# Patient Record
Sex: MALE | Race: WHITE
[De-identification: names, ages, dates, MRNs, and addresses within clinical notes are randomized per-mention and may not be internally consistent; named-entity substitution may affect disease eponyms.]

---

## 2019-02-03 ENCOUNTER — HOSPITAL ENCOUNTER (EMERGENCY)
Dept: HOSPITAL 76 - ED | Age: 19
Discharge: HOME | End: 2019-02-03
Payer: COMMERCIAL

## 2019-02-03 VITALS — SYSTOLIC BLOOD PRESSURE: 129 MMHG | DIASTOLIC BLOOD PRESSURE: 79 MMHG

## 2019-02-03 DIAGNOSIS — B34.9: Primary | ICD-10-CM

## 2019-02-03 DIAGNOSIS — Z88.0: ICD-10-CM

## 2019-02-03 PROCEDURE — 93005 ELECTROCARDIOGRAM TRACING: CPT

## 2019-02-03 PROCEDURE — 99283 EMERGENCY DEPT VISIT LOW MDM: CPT

## 2019-02-03 PROCEDURE — 87276 INFLUENZA A AG IF: CPT

## 2019-02-03 PROCEDURE — 87275 INFLUENZA B AG IF: CPT

## 2019-02-03 PROCEDURE — 71046 X-RAY EXAM CHEST 2 VIEWS: CPT

## 2019-02-03 NOTE — XRAY REPORT
Reason:  cp

Procedure Date:  02/03/2019   

Accession Number:  [id number] / [id number]                    

Procedure:  XR  - Chest 2 View X-Ray CPT Code:  10517

 

FULL RESULT:

 

 

EXAM:

CHEST RADIOGRAPHY

 

EXAM DATE: 2/3/2019 02:30 PM.

 

CLINICAL HISTORY: Cp.

 

COMPARISON: None.

 

TECHNIQUE: 2 views.

 

FINDINGS:

Lungs/Pleura: No focal opacities evident. No pleural effusion. No 

pneumothorax. Normal volumes.

 

Mediastinum: Heart and mediastinal contours are unremarkable.

 

Other: None.

IMPRESSION: Normal 2-view chest radiography.

 

[facility]

## 2019-02-03 NOTE — ED PHYSICIAN DOCUMENTATION
History of Present Illness





- Stated complaint


Stated Complaint: SOA/TIGHNESS





- Chief complaint


Chief Complaint: Resp





- Additonal information


Additional information: 


18-year-old male presents the emergency department for evaluation of day 2 of 

generalized weakness, body aches, chills, feeling feverish, feeling short of 

breath and generally unwell.  The symptoms started rather suddenly yesterday 

while out after eating.  The patient's symptoms have continued today.Symptoms 

are described as mild.  No other associated symptoms.  No relieving factors.








Review of Systems


Constitutional: reports: Fever, Chills, Myalgias, Fatigue


Eyes: denies: Discharge


Ears: denies: Ear pain


Nose: denies: Rhinorrhea / runny nose


Throat: reports: Sore throat


Respiratory: reports: Dyspnea


GI: denies: Abdominal Pain


: denies: Dysuria


Skin: denies: Rash


Musculoskeletal: denies: Neck pain


Neurologic: denies: Generalized weakness





PD PAST MEDICAL HISTORY





- Present Medications


Home Medications: 


                                Ambulatory Orders











 Medication  Instructions  Recorded  Confirmed


 


No Known Home Medications  02/03/19 02/03/19














- Allergies


Allergies/Adverse Reactions: 


                                    Allergies











Allergy/AdvReac Type Severity Reaction Status Date / Time


 


Penicillins Allergy  Rash Verified 02/03/19 13:09














- Social History


Does the pt smoke?: No


Smoking Status: Never smoker





PD ED PE NORMAL





- General


General: Alert and oriented X 3, No acute distress





- HEENT


HEENT: Atraumatic, PERRL, EOMI, Ears normal





- Neck


Neck: Supple, no meningeal sign





- Cardiac


Cardiac: RRR, Strong equal pulses





- Respiratory


Respiratory: No respiratory distress





- Abdomen


Abdomen: Soft, Non tender





- Derm


Derm: Normal color





- Extremities


Extremities: No deformity, No edema





- Neuro


Neuro: Alert and oriented X 3, CNs 2-12 intact, Normal speech





- Psych


Psych: Normal mood





Results





- Vitals


Vitals: 


                               Vital Signs - 24 hr











  02/03/19





  13:07


 


Temperature 36.4 C L


 


Heart Rate 91


 


Respiratory 20





Rate 


 


Blood Pressure 146/93 H


 


O2 Saturation 100








                                     Oxygen











O2 Source                      Room air

















- EKG (time done)


  ** 14:07


Rate: Rate (enter#)


Rhythm: NSR


Intervals: Normal DC


QRS: Normal


Ischemia: Normal ST segments





- Labs


Labs: 


                                Laboratory Tests











  02/03/19





  14:04


 


Influenza A (Rapid)  Negative


 


Influenza B (Rapid)  Negative














- Rads (name of study)


  ** CXR


Radiology: Final report received, See rad report (IMPRESSION: Normal 2-view 

chest radiography. )





PD MEDICAL DECISION MAKING





- ED course


ED course: 


The patient's symptoms seem to be consistent with a viral etiology.  The patient

on reevaluation is resting comfortably and appears to be in no acute distress 

and appears appropriate for discharge and ongoing outpatient management.  The 

patient will return to the emergency department for any worsening or any 

concerns.








Departure





- Departure


Disposition: 01 Home, Self Care


Clinical Impression: 


 Viral syndrome





Condition: Good


Instructions:  ED Viral Syndrome


Follow-Up: 


[facility] [place] [Provider Group] - Within 1 week


Comments: 


Please return to the emergency department for worsening symptoms or any concerns

## 2019-10-26 ENCOUNTER — HOSPITAL ENCOUNTER (EMERGENCY)
Dept: HOSPITAL 76 - ED | Age: 19
Discharge: HOME | End: 2019-10-26
Payer: COMMERCIAL

## 2019-10-26 VITALS — SYSTOLIC BLOOD PRESSURE: 139 MMHG | DIASTOLIC BLOOD PRESSURE: 58 MMHG

## 2019-10-26 DIAGNOSIS — Z98.818: ICD-10-CM

## 2019-10-26 DIAGNOSIS — Z88.0: ICD-10-CM

## 2019-10-26 DIAGNOSIS — K08.89: Primary | ICD-10-CM

## 2019-10-26 DIAGNOSIS — H93.11: ICD-10-CM

## 2019-10-26 PROCEDURE — 99282 EMERGENCY DEPT VISIT SF MDM: CPT

## 2019-10-26 PROCEDURE — 99284 EMERGENCY DEPT VISIT MOD MDM: CPT

## 2019-10-26 NOTE — ED PHYSICIAN DOCUMENTATION
PD HPI HEENT





- Stated complaint


Stated Complaint: DENTAL PX





- Chief complaint


Chief Complaint: Heent





- History obtained from


History obtained from: Patient





- History of Present Illness


Timing - duration: Days (2-3)


Timing - details: Gradual onset


Severity Comments: Minimal pain that is relieved with ibuprofen


Location: Other (Right jaw at the site of wisdom tooth extraction)


Associated symptoms: No: Fever


Recently seen: Not recently seen





- Additional information


Additional information: 





Is a 19-year-old who had his wisdom teeth extracted 8 days ago.  He had a 

follow-up5 days ago and things seem to be doing well.  He is now developed a 

weird taste coming from the socket on the bottom right.  The other sites have 

healed without any problem.  He has not had a fever.  He feels like his right 

ears are ringing all the time.  He has minimal pain that is relieved with 

ibuprofen.  He is allergic to penicillin it causes a rash.  No fever.





Review of Systems


Constitutional: denies: Fever


Ears: reports: Tinnitus/ringing


Throat: reports: Dental pain / toothache





PD PAST MEDICAL HISTORY





- Present Medications


Home Medications: 


                                Ambulatory Orders











 Medication  Instructions  Recorded  Confirmed


 


Clindamycin HCl [Clindamycin 300MG 300 mg PO QID #40 capsule 10/26/19 





CAP]   














- Allergies


Allergies/Adverse Reactions: 


                                    Allergies











Allergy/AdvReac Type Severity Reaction Status Date / Time


 


Penicillins Allergy  Rash Verified 10/26/19 17:57














- Social History


Does the pt smoke?: No


Smoking Status: Never smoker





PD ED PE NORMAL





- Vitals


Vital signs reviewed: Yes





- General


General: Alert and oriented X 3, No acute distress, Well developed/nourished





- HEENT


HEENT: Atraumatic, Moist mucous membranes, Other (The extraction sites on the 

left upper and lower are well-healed.  The right lower there is still a hole 

president.  There is no distinct odor.  There is no inflammatory changes noted. 

No jaw swelling and no bony tenderness.  There is no lymphadenopathy.)





- Neck


Neck: No adenopathy





- Derm


Derm: Normal color





- Neuro


Neuro: Alert and oriented X 3, Normal speech





- Psych


Psych: Normal mood, Normal affect





Results





- Vitals


Vitals: 


                               Vital Signs - 24 hr











  10/26/19





  17:57


 


Temperature 36.8 C


 


Heart Rate 85


 


Respiratory 16





Rate 


 


Blood Pressure 139/58 H


 


O2 Saturation 100








                                     Oxygen











O2 Source                      Room air

















PD MEDICAL DECISION MAKING





- ED course


Complexity details: d/w patient


ED course: 





2 of 3 wisdom tooth extraction sites have healed well.  Will place him on 

clindamycin given the penicillin allergy.  He is to continue swishing with the 

oral rinse he was provided by the dentist.  Recommended that he contact them for

follow-up appointment to make sure that any infection has cleared.





Departure





- Departure


Disposition: 01 Home, Self Care


Clinical Impression: 


 Pain, dental, Status post tooth extraction





Condition: Good


Instructions:  ED Tooth Pain


Follow-Up: 


[facility] [place] [Provider Group]


Prescriptions: 


Clindamycin HCl [Clindamycin 300MG CAP] 300 mg PO QID #40 capsule


Comments: 


Continue to do the oral rinses as prescribed by the dentist.  Take the 

clindamycin 1 tablet 4 times a day.  I would recommend that you take probiotics 

while you are taking the antibiotic and for 2 weeks afterwards.  Follow-up with 

the dentist for reevaluation and make sure that the infection has been cleared.


Discharge Date/Time: 10/26/19 18:40

## 2020-01-14 ENCOUNTER — HOSPITAL ENCOUNTER (EMERGENCY)
Dept: HOSPITAL 76 - ED | Age: 20
Discharge: HOME | End: 2020-01-14
Payer: COMMERCIAL

## 2020-01-14 VITALS — SYSTOLIC BLOOD PRESSURE: 121 MMHG | DIASTOLIC BLOOD PRESSURE: 70 MMHG

## 2020-01-14 DIAGNOSIS — R10.12: Primary | ICD-10-CM

## 2020-01-14 LAB
ALBUMIN DIAFP-MCNC: 4.4 G/DL (ref 3.2–5.5)
ALBUMIN/GLOB SERPL: 2 {RATIO} (ref 1–2.2)
ALP SERPL-CCNC: 51 IU/L (ref 42–121)
ALT SERPL W P-5'-P-CCNC: 16 IU/L (ref 10–60)
ANION GAP SERPL CALCULATED.4IONS-SCNC: 11 MMOL/L (ref 6–13)
AST SERPL W P-5'-P-CCNC: 21 IU/L (ref 10–42)
BASOPHILS NFR BLD AUTO: 0 10^3/UL (ref 0–0.1)
BASOPHILS NFR BLD AUTO: 0.4 %
BILIRUB BLD-MCNC: 2.4 MG/DL (ref 0.2–1)
BUN SERPL-MCNC: 15 MG/DL (ref 6–20)
CALCIUM UR-MCNC: 9.4 MG/DL (ref 8.5–10.3)
CHLORIDE SERPL-SCNC: 101 MMOL/L (ref 101–111)
CLARITY UR REFRACT.AUTO: CLEAR
CO2 SERPL-SCNC: 28 MMOL/L (ref 21–32)
CREAT SERPLBLD-SCNC: 1 MG/DL (ref 0.6–1.2)
EOSINOPHIL # BLD AUTO: 0 10^3/UL (ref 0–0.7)
EOSINOPHIL NFR BLD AUTO: 0.4 %
ERYTHROCYTE [DISTWIDTH] IN BLOOD BY AUTOMATED COUNT: 11.4 % (ref 12–15)
GFRSERPLBLD MDRD-ARVRAT: 94 ML/MIN/{1.73_M2} (ref 89–?)
GLOBULIN SER-MCNC: 2.2 G/DL (ref 2.1–4.2)
GLUCOSE SERPL-MCNC: 97 MG/DL (ref 70–100)
GLUCOSE UR QL STRIP.AUTO: NEGATIVE MG/DL
HGB UR QL STRIP: 14.6 G/DL (ref 14–18)
KETONES UR QL STRIP.AUTO: NEGATIVE MG/DL
LIPASE SERPL-CCNC: 24 U/L (ref 22–51)
LYMPHOCYTES # SPEC AUTO: 1.3 10^3/UL (ref 1.5–3.5)
LYMPHOCYTES NFR BLD AUTO: 25.8 %
MCH RBC QN AUTO: 30.4 PG (ref 27–31)
MCHC RBC AUTO-ENTMCNC: 33.8 G/DL (ref 32–36)
MCV RBC AUTO: 89.8 FL (ref 80–94)
MONOCYTES # BLD AUTO: 0.4 10^3/UL (ref 0–1)
MONOCYTES NFR BLD AUTO: 7.6 %
NEUTROPHILS # BLD AUTO: 3.2 10^3/UL (ref 1.5–6.6)
NEUTROPHILS # SNV AUTO: 4.9 X10^3/UL (ref 4.8–10.8)
NEUTROPHILS NFR BLD AUTO: 65.6 %
NITRITE UR QL STRIP.AUTO: NEGATIVE
PDW BLD AUTO: 9.7 FL (ref 7.4–11.4)
PH UR STRIP.AUTO: 6 PH (ref 5–7.5)
PLATELET # BLD: 246 10^3/UL (ref 130–450)
PROT SPEC-MCNC: 6.6 G/DL (ref 6.7–8.2)
PROT UR STRIP.AUTO-MCNC: NEGATIVE MG/DL
RBC # UR STRIP.AUTO: NEGATIVE /UL
RBC MAR: 4.81 10^6/UL (ref 4.7–6.1)
SODIUM SERPLBLD-SCNC: 140 MMOL/L (ref 135–145)
SP GR UR STRIP.AUTO: 1.02 (ref 1–1.03)
SQUAMOUS URNS QL MICRO: (no result)
T VAGINALIS RRNA GENITAL QL PROBE: NEGATIVE
UROBILINOGEN UR QL STRIP.AUTO: (no result) E.U./DL
UROBILINOGEN UR STRIP.AUTO-MCNC: NEGATIVE MG/DL

## 2020-01-14 PROCEDURE — 83690 ASSAY OF LIPASE: CPT

## 2020-01-14 PROCEDURE — 87491 CHLMYD TRACH DNA AMP PROBE: CPT

## 2020-01-14 PROCEDURE — 87086 URINE CULTURE/COLONY COUNT: CPT

## 2020-01-14 PROCEDURE — 99284 EMERGENCY DEPT VISIT MOD MDM: CPT

## 2020-01-14 PROCEDURE — 99283 EMERGENCY DEPT VISIT LOW MDM: CPT

## 2020-01-14 PROCEDURE — 81001 URINALYSIS AUTO W/SCOPE: CPT

## 2020-01-14 PROCEDURE — 36415 COLL VENOUS BLD VENIPUNCTURE: CPT

## 2020-01-14 PROCEDURE — 80053 COMPREHEN METABOLIC PANEL: CPT

## 2020-01-14 PROCEDURE — 87591 N.GONORRHOEAE DNA AMP PROB: CPT

## 2020-01-14 PROCEDURE — 85025 COMPLETE CBC W/AUTO DIFF WBC: CPT

## 2020-01-14 PROCEDURE — 81003 URINALYSIS AUTO W/O SCOPE: CPT

## 2020-01-14 PROCEDURE — 87661 TRICHOMONAS VAGINALIS AMPLIF: CPT

## 2020-01-14 NOTE — ED PHYSICIAN DOCUMENTATION
History of Present Illness





- Stated complaint


Stated Complaint: ABD PX





- Chief complaint


Chief Complaint: Abd Pain





- Additonal information


Additional information: 


This is a 19-year-old male who denies past medical history presents with abdomi

nal pain for 4 to 5 months which got little bit worse recently.  He states that 

his pain is been mostly left upper quadrant somewhat intermittent, radiates 

toward his epigastrium.  In the last several days he is also had a bit of 

discomfort in his right lower quadrant this is come and gone.  No nausea no 

vomiting no blood in his stool, he has not felt constipated.  He has not yet 

seen a provider for this issue.  He has not tried medications for it.  No 

testicular pain, no pain or burning when he urinates.








Review of Systems


Constitutional: denies: Fever


Cardiac: denies: Chest pain / pressure


GI: reports: Abdominal Pain


: denies: Dysuria


Neurologic: denies: Generalized weakness


Immunocompromised: denies: Immunocompromised





PD PAST MEDICAL HISTORY





- Past Medical History


Past Medical History: No


HEENT: Other





- Past Surgical History


Past Surgical History: No





- Present Medications


Home Medications: 


                                Ambulatory Orders











 Medication  Instructions  Recorded  Confirmed


 


Clindamycin HCl [Clindamycin 300MG 300 mg PO QID #40 capsule 10/26/19 





CAP]   


 


Famotidine [Acid Controller] 20 mg PO DAILY #14 tablet 01/14/20 














- Allergies


Allergies/Adverse Reactions: 


                                    Allergies











Allergy/AdvReac Type Severity Reaction Status Date / Time


 


Penicillins Allergy  Rash Verified 01/14/20 12:15














- Social History


Does the pt smoke?: No


Smoking Status: Never smoker


Does the pt drink ETOH?: Yes


ETOH Use: Beer





- Immunizations


Immunizations are current?: Yes





PD ED PE NORMAL





- Vitals


Vital signs reviewed: Yes





- General


General: Alert and oriented X 3, No acute distress





- HEENT


HEENT: PERRL





- Neck


Neck: Supple, no meningeal sign





- Cardiac


Cardiac: RRR, No murmur





- Respiratory


Respiratory: Clear bilaterally





- Abdomen


Abdomen: Soft, Non tender, Non distended, Other (No guarding.  No right lower 

quadrant or right upper quadrant tenderness with deep palpation)





- Derm


Derm: Warm and dry





- Extremities


Extremities: No deformity





- Neuro


Neuro: Alert and oriented X 3, CNs 2-12 intact, No motor deficit, No sensory 

deficit





- Psych


Psych: Normal mood, Normal affect





Results





- Vitals


Vitals: 


                               Vital Signs - 24 hr











  01/14/20 01/14/20





  12:11 15:36


 


Temperature 36.5 C 36.6 C


 


Heart Rate 67 82


 


Respiratory 18 16





Rate  


 


Blood Pressure 134/73 H 121/70


 


O2 Saturation 100 99








                                     Oxygen











O2 Source                      Room air

















- Labs


Labs: 


                                Laboratory Tests











  01/14/20 01/14/20 01/14/20





  12:50 12:50 15:23


 


WBC  4.9  


 


RBC  4.81  


 


Hgb  14.6  


 


Hct  43.2  


 


MCV  89.8  


 


MCH  30.4  


 


MCHC  33.8  


 


RDW  11.4 L  


 


Plt Count  246  


 


MPV  9.7  


 


Neut # (Auto)  3.2  


 


Lymph # (Auto)  1.3 L  


 


Mono # (Auto)  0.4  


 


Eos # (Auto)  0.0  


 


Baso # (Auto)  0.0  


 


Absolute Nucleated RBC  0.00  


 


Nucleated RBC %  0.0  


 


Sodium   140 


 


Potassium   4.1 


 


Chloride   101 


 


Carbon Dioxide   28 


 


Anion Gap   11.0 


 


BUN   15 


 


Creatinine   1.0 


 


Estimated GFR (MDRD)   94 


 


Glucose   97 


 


Calcium   9.4 


 


Total Bilirubin   2.4 H 


 


AST   21 


 


ALT   16 


 


Alkaline Phosphatase   51 


 


Total Protein   6.6 L 


 


Albumin   4.4 


 


Globulin   2.2 


 


Albumin/Globulin Ratio   2.0 


 


Lipase   24 


 


Urine Color    YELLOW


 


Urine Clarity    CLEAR


 


Urine pH    6.0


 


Ur Specific Gravity    1.025


 


Urine Protein    NEGATIVE


 


Urine Glucose (UA)    NEGATIVE


 


Urine Ketones    NEGATIVE


 


Urine Occult Blood    NEGATIVE


 


Urine Nitrite    NEGATIVE


 


Urine Bilirubin    NEGATIVE


 


Urine Urobilinogen    0.2 (NORMAL)


 


Ur Leukocyte Esterase    TRACE H


 


Urine RBC    None Seen


 


Urine WBC    0-3


 


Ur Squamous Epith Cells    NONE SEEN


 


Urine Bacteria    None Seen


 


Ur Microscopic Review    INDICATED


 


Urine Culture Comments    INDICATED


 


Chlam trachomat DNA PCR   


 


N.gonorrhoeae DNA (PCR)   


 


T. vaginalis (PCR)   














  01/14/20





  15:23


 


WBC 


 


RBC 


 


Hgb 


 


Hct 


 


MCV 


 


MCH 


 


MCHC 


 


RDW 


 


Plt Count 


 


MPV 


 


Neut # (Auto) 


 


Lymph # (Auto) 


 


Mono # (Auto) 


 


Eos # (Auto) 


 


Baso # (Auto) 


 


Absolute Nucleated RBC 


 


Nucleated RBC % 


 


Sodium 


 


Potassium 


 


Chloride 


 


Carbon Dioxide 


 


Anion Gap 


 


BUN 


 


Creatinine 


 


Estimated GFR (MDRD) 


 


Glucose 


 


Calcium 


 


Total Bilirubin 


 


AST 


 


ALT 


 


Alkaline Phosphatase 


 


Total Protein 


 


Albumin 


 


Globulin 


 


Albumin/Globulin Ratio 


 


Lipase 


 


Urine Color 


 


Urine Clarity 


 


Urine pH 


 


Ur Specific Gravity 


 


Urine Protein 


 


Urine Glucose (UA) 


 


Urine Ketones 


 


Urine Occult Blood 


 


Urine Nitrite 


 


Urine Bilirubin 


 


Urine Urobilinogen 


 


Ur Leukocyte Esterase 


 


Urine RBC 


 


Urine WBC 


 


Ur Squamous Epith Cells 


 


Urine Bacteria 


 


Ur Microscopic Review 


 


Urine Culture Comments 


 


Chlam trachomat DNA PCR  NEGATIVE


 


N.gonorrhoeae DNA (PCR)  NEGATIVE


 


T. vaginalis (PCR)  NEGATIVE














PD MEDICAL DECISION MAKING





- ED course


ED course: 





On examination patient is very well-appearing, vital signs are benign I do not 

elicit any tenderness of the palpation and any of the 4 abdominal quadrants.  He

has no vomiting, no urinary complaints, no scrotal pain.  His pain is also been 

going on for 4 to 5 months, making acute abdominal pathology extremely unlikely.

 His labs show no leukocytosis, CBC is within normal limits, his CMP is notable 

only for a mildly elevated bilirubin of 2.4.  He has no right upper quadrant 

pain with deep palpation, his discomfort is on the left upper quadrant. No signs

of acute biliary pathology at this time. His urine does not show convincing 

signs of infection, he does have 0-3 white blood cells, and out of an abundance 

of caution I did send Chlamydia gonorrhea, which is also negative.  I discussed 

with the patient that given his reassuring labs, vital signs, and abdominal exam

I recommended close outpatient follow up and it would also be reasonable to try 

acid blocking medication given he occasionally has a burning sensations epi

gastrium that improved when he burps. I prescribed him famotidine.  I discussed 

the importance of PCP follow-up, and also discussed return precautions, 

particularly if he is having pain that localizes to his right upper right lower 

quadrant, if he is having persistent vomiting, fever, or any other concerning 

symptoms.  Patient agreed with this plan was discharged home in very good 

condition.





Departure





- Departure


Disposition: 01 Home, Self Care


Clinical Impression: 


Abdominal pain


Qualifiers:


 Abdominal location: unspecified location Qualified Code(s): R10.9 - Unspecified

abdominal pain





Condition: Good


Instructions:  ED Abdominal Pain Unkn Cause


Prescriptions: 


Famotidine [Acid Controller] 20 mg PO DAILY #14 tablet


Comments: 


You were seen today for abdominal discomfort. Your labs are reassuring at this 

time, and I have not seen obvious cause of your pain. Please follow-up with your

primary care provider. If you having new or concerning symptoms such as pain 

that localizes to right lower quadrant, vomiting, fever, or other concerning 

symptoms return to the emergency department.


Discharge Date/Time: 01/14/20 16:14

## 2020-01-21 ENCOUNTER — HOSPITAL ENCOUNTER (EMERGENCY)
Dept: HOSPITAL 76 - ED | Age: 20
Discharge: HOME | End: 2020-01-21
Payer: COMMERCIAL

## 2020-01-21 VITALS — DIASTOLIC BLOOD PRESSURE: 75 MMHG | SYSTOLIC BLOOD PRESSURE: 114 MMHG

## 2020-01-21 DIAGNOSIS — R00.2: Primary | ICD-10-CM

## 2020-01-21 DIAGNOSIS — E87.6: ICD-10-CM

## 2020-01-21 LAB
ALBUMIN DIAFP-MCNC: 4.5 G/DL (ref 3.2–5.5)
ALBUMIN/GLOB SERPL: 1.8 {RATIO} (ref 1–2.2)
ALP SERPL-CCNC: 52 IU/L (ref 42–121)
ALT SERPL W P-5'-P-CCNC: 14 IU/L (ref 10–60)
ANION GAP SERPL CALCULATED.4IONS-SCNC: 11 MMOL/L (ref 6–13)
AST SERPL W P-5'-P-CCNC: 20 IU/L (ref 10–42)
BASOPHILS NFR BLD AUTO: 0 10^3/UL (ref 0–0.1)
BASOPHILS NFR BLD AUTO: 0.3 %
BILIRUB BLD-MCNC: 2.3 MG/DL (ref 0.2–1)
BUN SERPL-MCNC: 19 MG/DL (ref 6–20)
CALCIUM UR-MCNC: 9.1 MG/DL (ref 8.5–10.3)
CHLORIDE SERPL-SCNC: 97 MMOL/L (ref 101–111)
CO2 SERPL-SCNC: 29 MMOL/L (ref 21–32)
CREAT SERPLBLD-SCNC: 1.1 MG/DL (ref 0.6–1.2)
EOSINOPHIL # BLD AUTO: 0 10^3/UL (ref 0–0.7)
EOSINOPHIL NFR BLD AUTO: 0.3 %
ERYTHROCYTE [DISTWIDTH] IN BLOOD BY AUTOMATED COUNT: 11.6 % (ref 12–15)
GFRSERPLBLD MDRD-ARVRAT: 85 ML/MIN/{1.73_M2} (ref 89–?)
GLOBULIN SER-MCNC: 2.5 G/DL (ref 2.1–4.2)
GLUCOSE SERPL-MCNC: 94 MG/DL (ref 70–100)
HGB UR QL STRIP: 14.1 G/DL (ref 14–18)
LIPASE SERPL-CCNC: 27 U/L (ref 22–51)
LYMPHOCYTES # SPEC AUTO: 1.8 10^3/UL (ref 1.5–3.5)
LYMPHOCYTES NFR BLD AUTO: 25.2 %
MAGNESIUM SERPL-MCNC: 2.2 MG/DL (ref 1.7–2.8)
MCH RBC QN AUTO: 30.1 PG (ref 27–31)
MCHC RBC AUTO-ENTMCNC: 33.9 G/DL (ref 32–36)
MCV RBC AUTO: 88.9 FL (ref 80–94)
MONOCYTES # BLD AUTO: 0.5 10^3/UL (ref 0–1)
MONOCYTES NFR BLD AUTO: 6.9 %
NEUTROPHILS # BLD AUTO: 4.9 10^3/UL (ref 1.5–6.6)
NEUTROPHILS # SNV AUTO: 7.2 X10^3/UL (ref 4.8–10.8)
NEUTROPHILS NFR BLD AUTO: 67 %
PDW BLD AUTO: 9.8 FL (ref 7.4–11.4)
PLATELET # BLD: 249 10^3/UL (ref 130–450)
PROT SPEC-MCNC: 7 G/DL (ref 6.7–8.2)
RBC MAR: 4.68 10^6/UL (ref 4.7–6.1)
SODIUM SERPLBLD-SCNC: 137 MMOL/L (ref 135–145)

## 2020-01-21 PROCEDURE — 85025 COMPLETE CBC W/AUTO DIFF WBC: CPT

## 2020-01-21 PROCEDURE — 83735 ASSAY OF MAGNESIUM: CPT

## 2020-01-21 PROCEDURE — 93005 ELECTROCARDIOGRAM TRACING: CPT

## 2020-01-21 PROCEDURE — 83690 ASSAY OF LIPASE: CPT

## 2020-01-21 PROCEDURE — 36415 COLL VENOUS BLD VENIPUNCTURE: CPT

## 2020-01-21 PROCEDURE — 84443 ASSAY THYROID STIM HORMONE: CPT

## 2020-01-21 PROCEDURE — 99284 EMERGENCY DEPT VISIT MOD MDM: CPT

## 2020-01-21 PROCEDURE — 80053 COMPREHEN METABOLIC PANEL: CPT

## 2020-01-21 PROCEDURE — 99283 EMERGENCY DEPT VISIT LOW MDM: CPT

## 2020-01-21 NOTE — ED PHYSICIAN DOCUMENTATION
PD HPI CHEST PAIN





- Stated complaint


Stated Complaint: PALPUTATIONS,DIZZY





- Chief complaint


Chief Complaint: Cardiac





- History obtained from


History obtained from: Patient





- History of Present Illness


Timing - onset: How many days ago (4)


Timing - onset during: Rest


Timing - duration: Days (4)


Timing - details: Gradual onset, Still present, Intermittant


Quality: Throbbing (has intermittent feeling of heart surging/jumping. Has noted

his heart rate faster about 100-110 at times (according to his watch heart beat 

monitor). Did not have any HR in the 140-180 range. Had drank a lot on Saturday 

night, so thought maybe dehydrated. Had some Red Bulls with it as well and does 

not drink caffeine usually. Has continued with the palpitations feeling since, 

so here for eval. No dyspnea nor lightheaded feelings.).  No: Pressure, 

Tightness


Location: Substernal, Left chest


Worsened by: Other (notes the symptoms most when resting.).  No: Exertion, 

Inspiration


Associated symptoms: Palpitations.  No: Shortness of air, Nausea, Vomiting, 

Feeling faint / dizzy


Similar symptoms before: Has not had sx before


Recently seen: Not recently seen





Review of Systems


Constitutional: denies: Fever, Chills, Myalgias


Nose: denies: Rhinorrhea / runny nose, Congestion


Throat: denies: Sore throat


Cardiac: reports: Palpitations.  denies: Chest pain / pressure, Pedal edema, 

Calf pain


Respiratory: denies: Cough


GI: denies: Nausea, Vomiting, Constipation, Diarrhea


Neurologic: denies: Focal weakness, Numbness, Near syncope, Altered mental 

status





PD PAST MEDICAL HISTORY





- Past Medical History


Cardiovascular: None


Respiratory: None


Neuro: None


Endocrine/Autoimmune: None


HEENT: Other





- Past Surgical History


Past Surgical History: No





- Present Medications


Home Medications: 


                                Ambulatory Orders











 Medication  Instructions  Recorded  Confirmed


 


Famotidine [Acid Controller] 20 mg PO DAILY #14 tablet 01/14/20 


 


Potassium Chloride 10 meq PO DAILY #15 capsule.er 01/21/20 














- Allergies


Allergies/Adverse Reactions: 


                                    Allergies











Allergy/AdvReac Type Severity Reaction Status Date / Time


 


Penicillins Allergy  Rash Verified 01/21/20 18:52














- Social History


Does the pt smoke?: No


Smoking Status: Never smoker


Does the pt drink ETOH?: Yes





- Immunizations


Immunizations are current?: Yes





PD ED PE NORMAL





- Vitals


Vital signs reviewed: Yes





- General


General: Alert and oriented X 3, No acute distress, Well developed/nourished





- HEENT


HEENT: Moist mucous membranes, Pharynx benign





- Neck


Neck: Supple, no meningeal sign, No adenopathy, Thyroid normal





- Cardiac


Cardiac: RRR, No murmur





- Respiratory


Respiratory: Clear bilaterally





- Abdomen


Abdomen: Soft, Non tender





- Derm


Derm: Normal color, Warm and dry





- Extremities


Extremities: No tenderness to palpate, Normal ROM s pain, No edema, No calf 

tenderness / cord





- Neuro


Neuro: Alert and oriented X 3, No motor deficit, Normal speech





Results





- Vitals


Vitals: 


                               Vital Signs - 24 hr











  01/21/20 01/21/20





  18:49 21:16


 


Temperature 36.8 C 


 


Heart Rate 80 80


 


Respiratory 18 18





Rate  


 


Blood Pressure 148/88 H 114/75


 


O2 Saturation 100 100








                                     Oxygen











O2 Source                      Room air

















- EKG (time done)


  ** 18:57


Rate: Rate (enter#) (81)


Rhythm: NSR


Axis: Normal


Intervals: Normal MI


QRS: Normal


Ischemia: Normal ST segments.  No: ST elevation c/w ischemia, ST depression





- Labs


Labs: 


                                Laboratory Tests











  01/21/20 01/21/20 01/21/20





  20:20 20:20 20:20


 


WBC  7.2  


 


RBC  4.68 L  


 


Hgb  14.1  


 


Hct  41.6 L  


 


MCV  88.9  


 


MCH  30.1  


 


MCHC  33.9  


 


RDW  11.6 L  


 


Plt Count  249  


 


MPV  9.8  


 


Neut # (Auto)  4.9  


 


Lymph # (Auto)  1.8  


 


Mono # (Auto)  0.5  


 


Eos # (Auto)  0.0  


 


Baso # (Auto)  0.0  


 


Absolute Nucleated RBC  0.00  


 


Nucleated RBC %  0.0  


 


Sodium   137 


 


Potassium   3.4 L 


 


Chloride   97 L 


 


Carbon Dioxide   29 


 


Anion Gap   11.0 


 


BUN   19 


 


Creatinine   1.1 


 


Estimated GFR (MDRD)   85 L 


 


Glucose   94 


 


Calcium   9.1 


 


Magnesium   2.2 


 


Total Bilirubin   2.3 H 


 


AST   20 


 


ALT   14 


 


Alkaline Phosphatase   52 


 


Total Protein   7.0 


 


Albumin   4.5 


 


Globulin   2.5 


 


Albumin/Globulin Ratio   1.8 


 


Lipase   27 


 


TSH    1.19














PD MEDICAL DECISION MAKING





- ED course


Complexity details: reviewed results (slightly low potassium. Else normal. ), 

considered differential, d/w patient





Departure





- Departure


Disposition: 01 Home, Self Care


Clinical Impression: 


 Palpitations, Hypokalemia





Condition: Stable


Record reviewed to determine appropriate education?: Yes


Instructions:  ED Palpitations


Prescriptions: 


Potassium Chloride 10 meq PO DAILY #15 capsule.er


Comments: 


Your EKG is normal here.  The palpitations you are feeling may be a persistent 

effect from the caffeine and such that you had a few days ago.  Stay well-

hydrated.  And I would anticipate this to improve over the next few days.  Your 

potassium level is a little bit low and can be enhancing the palpitations 

occurring.  Add a potassium supplement over the next week or so.  Recheck if not

improved over the next few days.


Discharge Date/Time: 01/21/20 21:43

## 2021-08-15 ENCOUNTER — HOSPITAL ENCOUNTER (EMERGENCY)
Dept: HOSPITAL 76 - ED | Age: 21
Discharge: HOME | End: 2021-08-15
Payer: COMMERCIAL

## 2021-08-15 VITALS — SYSTOLIC BLOOD PRESSURE: 132 MMHG | DIASTOLIC BLOOD PRESSURE: 68 MMHG

## 2021-08-15 DIAGNOSIS — R10.31: Primary | ICD-10-CM

## 2021-08-15 LAB
ALBUMIN DIAFP-MCNC: 4.2 G/DL (ref 3.2–5.5)
ALBUMIN/GLOB SERPL: 1.9 {RATIO} (ref 1–2.2)
ALP SERPL-CCNC: 51 IU/L (ref 42–121)
ALT SERPL W P-5'-P-CCNC: 18 IU/L (ref 10–60)
ANION GAP SERPL CALCULATED.4IONS-SCNC: 9 MMOL/L (ref 6–13)
AST SERPL W P-5'-P-CCNC: 22 IU/L (ref 10–42)
BASOPHILS NFR BLD AUTO: 0 10^3/UL (ref 0–0.1)
BASOPHILS NFR BLD AUTO: 0.3 %
BILIRUB BLD-MCNC: 1.8 MG/DL (ref 0.2–1)
BUN SERPL-MCNC: 21 MG/DL (ref 6–20)
CALCIUM UR-MCNC: 9.6 MG/DL (ref 8.5–10.3)
CHLORIDE SERPL-SCNC: 107 MMOL/L (ref 101–111)
CLARITY UR REFRACT.AUTO: CLEAR
CO2 SERPL-SCNC: 28 MMOL/L (ref 21–32)
CREAT SERPLBLD-SCNC: 1 MG/DL (ref 0.6–1.2)
EOSINOPHIL # BLD AUTO: 0 10^3/UL (ref 0–0.7)
EOSINOPHIL NFR BLD AUTO: 0.4 %
ERYTHROCYTE [DISTWIDTH] IN BLOOD BY AUTOMATED COUNT: 11.6 % (ref 12–15)
GFRSERPLBLD MDRD-ARVRAT: 93 ML/MIN/{1.73_M2} (ref 89–?)
GLOBULIN SER-MCNC: 2.2 G/DL (ref 2.1–4.2)
GLUCOSE SERPL-MCNC: 93 MG/DL (ref 70–100)
GLUCOSE UR QL STRIP.AUTO: NEGATIVE MG/DL
HCT VFR BLD AUTO: 43.5 % (ref 42–52)
HGB UR QL STRIP: 14.6 G/DL (ref 14–18)
KETONES UR QL STRIP.AUTO: NEGATIVE MG/DL
LIPASE SERPL-CCNC: 26 U/L (ref 22–51)
LYMPHOCYTES # SPEC AUTO: 1.6 10^3/UL (ref 1.5–3.5)
LYMPHOCYTES NFR BLD AUTO: 19.8 %
MCH RBC QN AUTO: 30.5 PG (ref 27–31)
MCHC RBC AUTO-ENTMCNC: 33.6 G/DL (ref 32–36)
MCV RBC AUTO: 90.8 FL (ref 80–94)
MONOCYTES # BLD AUTO: 0.6 10^3/UL (ref 0–1)
MONOCYTES NFR BLD AUTO: 7.3 %
NEUTROPHILS # BLD AUTO: 5.7 10^3/UL (ref 1.5–6.6)
NEUTROPHILS # SNV AUTO: 7.9 X10^3/UL (ref 4.8–10.8)
NEUTROPHILS NFR BLD AUTO: 71.8 %
NITRITE UR QL STRIP.AUTO: NEGATIVE
NRBC # BLD AUTO: 0 /100WBC
NRBC # BLD AUTO: 0 X10^3/UL
PDW BLD AUTO: 9.7 FL (ref 7.4–11.4)
PH UR STRIP.AUTO: 6 PH (ref 5–7.5)
PLATELET # BLD: 232 10^3/UL (ref 130–450)
POTASSIUM SERPL-SCNC: 4.2 MMOL/L (ref 3.5–5)
PROT SPEC-MCNC: 6.4 G/DL (ref 6.7–8.2)
PROT UR STRIP.AUTO-MCNC: NEGATIVE MG/DL
RBC # UR STRIP.AUTO: NEGATIVE /UL
RBC MAR: 4.79 10^6/UL (ref 4.7–6.1)
SODIUM SERPLBLD-SCNC: 144 MMOL/L (ref 135–145)
SP GR UR STRIP.AUTO: 1.02 (ref 1–1.03)
UROBILINOGEN UR QL STRIP.AUTO: (no result) E.U./DL
UROBILINOGEN UR STRIP.AUTO-MCNC: NEGATIVE MG/DL

## 2021-08-15 PROCEDURE — 81001 URINALYSIS AUTO W/SCOPE: CPT

## 2021-08-15 PROCEDURE — 74177 CT ABD & PELVIS W/CONTRAST: CPT

## 2021-08-15 PROCEDURE — 36415 COLL VENOUS BLD VENIPUNCTURE: CPT

## 2021-08-15 PROCEDURE — 80053 COMPREHEN METABOLIC PANEL: CPT

## 2021-08-15 PROCEDURE — 81003 URINALYSIS AUTO W/O SCOPE: CPT

## 2021-08-15 PROCEDURE — 99284 EMERGENCY DEPT VISIT MOD MDM: CPT

## 2021-08-15 PROCEDURE — 87086 URINE CULTURE/COLONY COUNT: CPT

## 2021-08-15 PROCEDURE — 85025 COMPLETE CBC W/AUTO DIFF WBC: CPT

## 2021-08-15 PROCEDURE — 83690 ASSAY OF LIPASE: CPT

## 2021-08-15 NOTE — CT REPORT
PROCEDURE:  Abdomen/Pelvis W

 

INDICATIONS:  RLQ abd pain

 

CONTRAST:  IV CONTRAST: Isovue 300 ml: 100 PO CONTRAST: *NO PO CONTRAST 

 

TECHNIQUE:  

After the administration of IV contrast, 5 mm thick sections acquired from the diaphragms to the symp
hysis.  5 mm thick coronal and sagittal reformats were acquired.  For radiation dose reduction, the f
ollowing was used:  automated exposure control, adjustment of mA and/or kV according to patient size.
  

 

COMPARISON:  None.

 

FINDINGS:  

ABDOMEN:

 

Lung bases: Normal

Heart:Normal. 

 

Liver: Normal.

Gallbladder: Normal

Bile ducts: Normal. 

Pancreas: Normal. 

Spleen: Normal. 

Adrenals: Normal. 

Kidneys and ureters: Normal. 

Stomach and duodenum: Normal. 

Bowel: Normal. Normal appearance of the appendix.

Other: No free fluid or air. 

Abdominal nodes: Normal. 

Aorta: Normal in size.

IVC: Normal. 

Ventral wall: Normal. 

 

PELVIS:

 

Bladder: Normal.

Pelvic nodes: Normal.

Inguinal: No hernia.  

Bones: No vertebral body compression fracture. No suspicious bone lesion. 

 

 

IMPRESSION:

Normal appendix. No acute abnormality.

 

 

Reviewed by: Getachew Tello MD on 8/15/2021 7:36 PM PDT

Approved by: Getachew Tello MD on 8/15/2021 7:36 PM PDT

 

 

Station ID:  [id number]

## 2021-08-15 NOTE — ED PHYSICIAN DOCUMENTATION
PD HPI ABD PAIN





- Stated complaint


Stated Complaint: ABD PX





- Chief complaint


Chief Complaint: Abd Pain





- History obtained from


History obtained from: Patient





- History of Present Illness


Timing - onset: How many weeks ago (2)


Timing - duration: Weeks (2)


Timing - details: Intermittant, Waxing and waning


Pain level max: 7


Pain level now: 3


Quality: Aching, Pain


Location: RLQ


Radiation: No: Chest, , Lower back, Left flank, Left shoulder, Right flank, 

Right shoulder, Upper back


Improved by: No: Eating, Laying still, Vomiting, BM, Position, Meds


Worsened by: Moving, Palpation


Associated symptoms: No: Fever, Nausea, Vomiting, Hematemesis, Diarrhea, 

Constipation, Melena, Hematochezia, Dysuria, Hematuria





Review of Systems


Ten Systems: 10 systems reviewed and negative


Constitutional: denies: Fever, Chills


Nose: denies: Rhinorrhea / runny nose, Congestion


Throat: denies: Sore throat


Cardiac: denies: Palpitations


Respiratory: denies: Cough


GI: denies: Abdominal Pain, Nausea, Vomiting, Diarrhea


Skin: denies: Rash


Musculoskeletal: denies: Neck pain, Back pain


Neurologic: denies: Headache





PD PAST MEDICAL HISTORY





- Past Medical History


Cardiovascular: None


Respiratory: None


Neuro: None


Endocrine/Autoimmune: None


HEENT: Other





- Past Surgical History


Past Surgical History: No





- Present Medications


Home Medications: 


                                Ambulatory Orders











 Medication  Instructions  Recorded  Confirmed


 


Famotidine [Acid Controller] 20 mg PO DAILY #14 tablet 01/14/20 


 


Potassium Chloride 10 meq PO DAILY #15 capsule.er 01/21/20 














- Allergies


Allergies/Adverse Reactions: 


                                    Allergies











Allergy/AdvReac Type Severity Reaction Status Date / Time


 


Penicillins Allergy  Rash Verified 08/15/21 16:09














- Social History


Does the pt smoke?: No


Smoking Status: Never smoker


Does the pt drink ETOH?: Yes


Does the pt have substance abuse?: No





- Immunizations


Immunizations are current?: Yes





PD ED PE NORMAL





- Vitals


Vital signs reviewed: Yes





- General


General: Alert and oriented X 3, No acute distress





- HEENT


HEENT: Moist mucous membranes





- Neck


Neck: Supple, no meningeal sign





- Cardiac


Cardiac: RRR, Strong equal pulses





- Respiratory


Respiratory: No respiratory distress, Clear bilaterally





- Abdomen


Abdomen: Soft, Non distended, Other (mild TTP RLQ. no peritoneal signs.)





- Back


Back: No CVA TTP, No spinal TTP





- Derm


Derm: Warm and dry





- Neuro


Neuro: Alert and oriented X 3





- Psych


Psych: Normal mood, Normal affect





Results





- Vitals


Vitals: 


                               Vital Signs - 24 hr











  08/15/21 08/15/21 08/15/21





  16:07 19:28 20:10


 


Temperature 36.6 C  37.1 C


 


Heart Rate 72 60 70


 


Respiratory 16 16 16





Rate   


 


Blood Pressure 140/79 H 136/73 H 132/68 H


 


O2 Saturation 100 100 100








                                     Oxygen











O2 Source                      Room air

















- Labs


Labs: 


                                Laboratory Tests











  08/15/21 08/15/21 08/15/21





  16:14 16:14 17:52


 


WBC  7.9  


 


RBC  4.79  


 


Hgb  14.6  


 


Hct  43.5  


 


MCV  90.8  


 


MCH  30.5  


 


MCHC  33.6  


 


RDW  11.6 L  


 


Plt Count  232  


 


MPV  9.7  


 


Neut # (Auto)  5.7  


 


Lymph # (Auto)  1.6  


 


Mono # (Auto)  0.6  


 


Eos # (Auto)  0.0  


 


Baso # (Auto)  0.0  


 


Absolute Nucleated RBC  0.00  


 


Nucleated RBC %  0.0  


 


Sodium   144 


 


Potassium   4.2 


 


Chloride   107 


 


Carbon Dioxide   28 


 


Anion Gap   9.0 


 


BUN   21 H 


 


Creatinine   1.0 


 


Estimated GFR (MDRD)   93 


 


Glucose   93 


 


Calcium   9.6 


 


Total Bilirubin   1.8 H 


 


AST   22 


 


ALT   18 


 


Alkaline Phosphatase   51 


 


Total Protein   6.4 L 


 


Albumin   4.2 


 


Globulin   2.2 


 


Albumin/Globulin Ratio   1.9 


 


Lipase   26 


 


Urine Color    YELLOW


 


Urine Clarity    CLEAR


 


Urine pH    6.0


 


Ur Specific Gravity    1.025


 


Urine Protein    NEGATIVE


 


Urine Glucose (UA)    NEGATIVE


 


Urine Ketones    NEGATIVE


 


Urine Occult Blood    NEGATIVE


 


Urine Nitrite    NEGATIVE


 


Urine Bilirubin    NEGATIVE


 


Urine Urobilinogen    0.2 (NORMAL)


 


Ur Leukocyte Esterase    NEGATIVE


 


Ur Microscopic Review    NOT INDICATED


 


Urine Culture Comments    NOT INDICATED














- Rads (name of study)


  ** CT abdomen/pelvis


Radiology: Final report received, EMP read contemporaneously, See rad report 

(Normal appendix. No acute abnormality. )





PD MEDICAL DECISION MAKING





- ED course


Complexity details: reviewed results, re-evaluated patient, considered 

differential, d/w patient


ED course: 





Patient with right lower quadrant abdominal pain.  Unclear etiology.  No acute 

findings on CT scan, laboratory testing.  Patient is well-appearing, nontoxic.  

Does not appear to be in significant distress at this time.  We will continue 

supportive care and have him follow-up with his doctor for further care.  

Patient denies any symptoms of a hernia, feeling any masses or bulges.  Patient 

counseled regarding signs and symptoms for which I believe and urgent re-

evaluation would be necessary. Patient with good understanding of and agreement 

to plan and is comfortable going home at this time





This document was made in part using voice recognition software. While efforts 

are made to proofread this document, sound alike and grammatical errors may 

occur.





Departure





- Departure


Disposition: 01 Home, Self Care


Clinical Impression: 


Abdominal pain


Qualifiers:


 Abdominal location: right lower quadrant Qualified Code(s): R10.31 - Right 

lower quadrant pain





Condition: Good


Instructions:  ED Abdominal Pain Unkn Cause Male


Follow-Up: 


your,doctor in 1 week for recheck [Other]


Comments: 


The cause of your symptoms is unclear.  Your laboratory testing does not show 

any acute abnormalities today.  Your CT scan does not show any acute 

abnormalities either.  Please follow-up with your doctor for further care.  

Return if you worsen.


Discharge Date/Time: 08/15/21 20:11

## 2022-02-18 ENCOUNTER — HOSPITAL ENCOUNTER (EMERGENCY)
Dept: HOSPITAL 76 - ED | Age: 22
Discharge: HOME | End: 2022-02-18
Payer: COMMERCIAL

## 2022-02-18 VITALS — SYSTOLIC BLOOD PRESSURE: 143 MMHG | DIASTOLIC BLOOD PRESSURE: 80 MMHG

## 2022-02-18 DIAGNOSIS — M62.830: Primary | ICD-10-CM

## 2022-02-18 PROCEDURE — 99282 EMERGENCY DEPT VISIT SF MDM: CPT

## 2022-02-18 PROCEDURE — 99283 EMERGENCY DEPT VISIT LOW MDM: CPT

## 2022-02-18 PROCEDURE — 96372 THER/PROPH/DIAG INJ SC/IM: CPT

## 2022-02-18 NOTE — ED PHYSICIAN DOCUMENTATION
PD HPI BACK PAIN





- Stated complaint


Stated Complaint: BACK PX





- Chief complaint


Chief Complaint: Back Pain





- History obtained from


History obtained from: Patient





- Additional information


Additional information: 





Right upper back pain x 2 days, worse with motion, better with rest, no injury.


No fevers/IVDU.


No weakness, numbness, saddle anesthesia, incontinence





Review of Systems


Constitutional: denies: Fever, Chills


Cardiac: denies: Chest pain / pressure, Palpitations


Respiratory: denies: Dyspnea, Cough





PD PAST MEDICAL HISTORY





- Past Medical History


Cardiovascular: None


Respiratory: None


Neuro: None


Endocrine/Autoimmune: None


HEENT: Other





- Past Surgical History


Past Surgical History: No





- Present Medications


Home Medications: 


                                Ambulatory Orders











 Medication  Instructions  Recorded  Confirmed


 


Cyclobenzaprine [Flexeril] 10 mg PO TID PRN #20 tablet 02/18/22 














- Allergies


Allergies/Adverse Reactions: 


                                    Allergies











Allergy/AdvReac Type Severity Reaction Status Date / Time


 


Penicillins Allergy  Rash Verified 02/18/22 09:56














- Social History


Does the pt smoke?: No


Smoking Status: Never smoker


Does the pt drink ETOH?: Yes


Does the pt have substance abuse?: No





- Immunizations


Immunizations are current?: Yes





PD ED PE NORMAL





- Vitals


Vital signs reviewed: Yes





- General


General: Alert and oriented X 3, No acute distress





- HEENT


HEENT: PERRL, EOMI





- Back


Back: Other (Tender right lower thoracic and upper lumbar musculature on the 

right, no midline spinal tenderness.)





- Extremities


Extremities: Other (The patient has equal and normal Achilles and patellar 

reflexes bilaterally.  Normal sensation in all areas of the legs.  Patient d

enies saddle anesthesia.  Normal strength in flexion-extension at the ankles, 

knees, and flexion of the hips.)





- Neuro


Neuro: Alert and oriented X 3, Normal speech





Results





- Vitals


Vitals: 


                               Vital Signs - 24 hr











  02/18/22





  09:53


 


Temperature 36.4 C L


 


Heart Rate 77


 


Respiratory 12





Rate 


 


Blood Pressure 143/80 H


 


O2 Saturation 100








                                     Oxygen











O2 Source                      Room air

















PD MEDICAL DECISION MAKING





- ED course


ED course: 





This patient has seemingly uncomplicated musculoskeletal back pain.  The patient

 has no "red flags."  Specifically denies IV drug use, fevers, incontinence, 

saddle anesthesia.  Spinal epidural abscess was considered, given that the 

patient has no fever, is not diabetic, has no spinal tenderness, does not use IV

 drugs, and has no bilateral neurologic symptoms, the diagnosis of spinal 

epidural abscess is considered exceedingly unlikely.





Departure





- Departure


Disposition: 01 Home, Self Care


Clinical Impression: 


 Back spasm





Condition: Good


Record reviewed to determine appropriate education?: Yes


Instructions:  ED Spasm Back No Trauma


Prescriptions: 


Cyclobenzaprine [Flexeril] 10 mg PO TID PRN #20 tablet


 PRN Reason: Spasms


Comments: 


[facility] [place].





[place] for new or worsening symptoms.








Forms:  Activity restrictions